# Patient Record
Sex: FEMALE | Race: BLACK OR AFRICAN AMERICAN | NOT HISPANIC OR LATINO
[De-identification: names, ages, dates, MRNs, and addresses within clinical notes are randomized per-mention and may not be internally consistent; named-entity substitution may affect disease eponyms.]

---

## 2023-05-22 ENCOUNTER — APPOINTMENT (OUTPATIENT)
Dept: CARDIOLOGY | Facility: CLINIC | Age: 43
End: 2023-05-22
Payer: MEDICAID

## 2023-05-22 ENCOUNTER — NON-APPOINTMENT (OUTPATIENT)
Age: 43
End: 2023-05-22

## 2023-05-22 VITALS
SYSTOLIC BLOOD PRESSURE: 149 MMHG | DIASTOLIC BLOOD PRESSURE: 102 MMHG | BODY MASS INDEX: 43.4 KG/M2 | OXYGEN SATURATION: 99 % | HEIGHT: 69 IN | HEART RATE: 83 BPM | WEIGHT: 293 LBS

## 2023-05-22 DIAGNOSIS — Z78.9 OTHER SPECIFIED HEALTH STATUS: ICD-10-CM

## 2023-05-22 DIAGNOSIS — Z83.3 FAMILY HISTORY OF DIABETES MELLITUS: ICD-10-CM

## 2023-05-22 DIAGNOSIS — D64.9 ANEMIA, UNSPECIFIED: ICD-10-CM

## 2023-05-22 DIAGNOSIS — E66.9 OBESITY, UNSPECIFIED: ICD-10-CM

## 2023-05-22 DIAGNOSIS — Z82.49 FAMILY HISTORY OF ISCHEMIC HEART DISEASE AND OTHER DISEASES OF THE CIRCULATORY SYSTEM: ICD-10-CM

## 2023-05-22 DIAGNOSIS — I51.7 CARDIOMEGALY: ICD-10-CM

## 2023-05-22 DIAGNOSIS — F43.10 POST-TRAUMATIC STRESS DISORDER, UNSPECIFIED: ICD-10-CM

## 2023-05-22 PROCEDURE — 99205 OFFICE O/P NEW HI 60 MIN: CPT | Mod: 25

## 2023-05-22 PROCEDURE — 93000 ELECTROCARDIOGRAM COMPLETE: CPT

## 2023-05-22 RX ORDER — CHOLECALCIFEROL (VITAMIN D3) 1250 MCG
1.25 MG CAPSULE ORAL
Refills: 0 | Status: ACTIVE | COMMUNITY

## 2023-05-22 RX ORDER — CHLORTHALIDONE 25 MG/1
25 TABLET ORAL DAILY
Qty: 90 | Refills: 3 | Status: ACTIVE | COMMUNITY
Start: 2023-05-22 | End: 1900-01-01

## 2023-05-22 RX ORDER — FOLIC ACID 1 MG/1
1 TABLET ORAL
Refills: 0 | Status: ACTIVE | COMMUNITY

## 2023-05-22 RX ORDER — VITAMIN B COMPLEX
TABLET ORAL
Refills: 0 | Status: ACTIVE | COMMUNITY

## 2023-05-22 RX ORDER — MULTIVIT-MIN/FOLIC/VIT K/LYCOP 400-300MCG
1000 TABLET ORAL
Refills: 0 | Status: ACTIVE | COMMUNITY

## 2023-05-22 NOTE — PHYSICAL EXAM
[Well Developed] : well developed [Well Nourished] : well nourished [No Acute Distress] : no acute distress [Normal Conjunctiva] : normal conjunctiva [No Carotid Bruit] : no carotid bruit [Normal Venous Pressure] : normal venous pressure [Normal S1, S2] : normal S1, S2 [No Murmur] : no murmur [No Rub] : no rub [No Gallop] : no gallop [Clear Lung Fields] : clear lung fields [Good Air Entry] : good air entry [No Respiratory Distress] : no respiratory distress  [Soft] : abdomen soft [Non Tender] : non-tender [Normal Bowel Sounds] : normal bowel sounds [No Masses/organomegaly] : no masses/organomegaly [Normal Gait] : normal gait [No Edema] : no edema [No Cyanosis] : no cyanosis [No Varicosities] : no varicosities [No Clubbing] : no clubbing [No Rash] : no rash [No Skin Lesions] : no skin lesions [Moves all extremities] : moves all extremities [No Focal Deficits] : no focal deficits [Normal Speech] : normal speech [Alert and Oriented] : alert and oriented [Normal memory] : normal memory

## 2023-06-12 PROBLEM — I51.7 CARDIOMEGALY: Status: ACTIVE | Noted: 2023-05-22

## 2023-06-12 NOTE — HISTORY OF PRESENT ILLNESS
[FreeTextEntry1] : Ms. Lion is a 43 year-old woman with headache, left shoulder pain going to the left leg - noticed symptoms more post covid vaccine.Noted chest pressure when she lays down but not when walking around.

## 2023-06-12 NOTE — DISCUSSION/SUMMARY
[FreeTextEntry1] : Plan:\par 1. c/w medrx\par 2. diet and lifesttyle mod [EKG obtained to assist in diagnosis and management of assessed problem(s)] : EKG obtained to assist in diagnosis and management of assessed problem(s)

## 2023-06-28 ENCOUNTER — APPOINTMENT (OUTPATIENT)
Dept: CARDIOLOGY | Facility: CLINIC | Age: 43
End: 2023-06-28
Payer: MEDICAID

## 2023-06-28 ENCOUNTER — NON-APPOINTMENT (OUTPATIENT)
Age: 43
End: 2023-06-28

## 2023-06-28 VITALS
DIASTOLIC BLOOD PRESSURE: 85 MMHG | HEIGHT: 69 IN | OXYGEN SATURATION: 98 % | BODY MASS INDEX: 43.4 KG/M2 | WEIGHT: 293 LBS | HEART RATE: 70 BPM | SYSTOLIC BLOOD PRESSURE: 127 MMHG

## 2023-06-28 DIAGNOSIS — R06.02 SHORTNESS OF BREATH: ICD-10-CM

## 2023-06-28 DIAGNOSIS — I10 ESSENTIAL (PRIMARY) HYPERTENSION: ICD-10-CM

## 2023-06-28 DIAGNOSIS — Z00.00 ENCOUNTER FOR GENERAL ADULT MEDICAL EXAMINATION W/OUT ABNORMAL FINDINGS: ICD-10-CM

## 2023-06-28 PROCEDURE — 93000 ELECTROCARDIOGRAM COMPLETE: CPT

## 2023-06-28 PROCEDURE — 99214 OFFICE O/P EST MOD 30 MIN: CPT | Mod: 25

## 2023-08-06 PROBLEM — I10 BENIGN ESSENTIAL HYPERTENSION: Status: ACTIVE | Noted: 2023-05-22

## 2023-08-06 PROBLEM — Z00.00 ENCOUNTER FOR PREVENTIVE HEALTH EXAMINATION: Status: ACTIVE | Noted: 2023-05-16

## 2023-08-06 PROBLEM — R06.02 SHORTNESS OF BREATH: Status: ACTIVE | Noted: 2023-05-22

## 2023-08-06 NOTE — HISTORY OF PRESENT ILLNESS
[FreeTextEntry1] : Ms. Lion is a 43 year-old woman with headache, left shoulder pain going to the left leg - noticed symptoms more post covid vaccine.Noted chest pressure when she lays down but not when walking around. \par \par Follow-up 6/28/23 - no new issues. BP better controlled.

## 2023-08-06 NOTE — DISCUSSION/SUMMARY
[FreeTextEntry1] : 1. C/w current management.\par 2. Diet and lifestyle modification.\par 3. Follow up in 3 months. [EKG obtained to assist in diagnosis and management of assessed problem(s)] : EKG obtained to assist in diagnosis and management of assessed problem(s)

## 2023-11-22 ENCOUNTER — APPOINTMENT (OUTPATIENT)
Dept: CARDIOLOGY | Facility: CLINIC | Age: 43
End: 2023-11-22

## 2024-01-24 ENCOUNTER — EMERGENCY (EMERGENCY)
Facility: HOSPITAL | Age: 44
LOS: 0 days | Discharge: HOME | End: 2024-01-24
Attending: STUDENT IN AN ORGANIZED HEALTH CARE EDUCATION/TRAINING PROGRAM
Payer: MEDICAID

## 2024-01-24 VITALS
HEIGHT: 69 IN | RESPIRATION RATE: 20 BRPM | HEART RATE: 84 BPM | WEIGHT: 287.04 LBS | TEMPERATURE: 98 F | OXYGEN SATURATION: 97 % | SYSTOLIC BLOOD PRESSURE: 146 MMHG | DIASTOLIC BLOOD PRESSURE: 97 MMHG

## 2024-01-24 VITALS
RESPIRATION RATE: 20 BRPM | DIASTOLIC BLOOD PRESSURE: 80 MMHG | OXYGEN SATURATION: 100 % | SYSTOLIC BLOOD PRESSURE: 135 MMHG | TEMPERATURE: 98 F | HEART RATE: 64 BPM

## 2024-01-24 DIAGNOSIS — R51.9 HEADACHE, UNSPECIFIED: ICD-10-CM

## 2024-01-24 LAB
ALBUMIN SERPL ELPH-MCNC: 3.4 G/DL — SIGNIFICANT CHANGE UP (ref 3.3–5)
ALP SERPL-CCNC: 81 U/L — SIGNIFICANT CHANGE UP (ref 40–120)
ALT FLD-CCNC: 22 U/L — SIGNIFICANT CHANGE UP (ref 12–78)
ANION GAP SERPL CALC-SCNC: 6 MMOL/L — SIGNIFICANT CHANGE UP (ref 5–17)
ANISOCYTOSIS BLD QL: SLIGHT — SIGNIFICANT CHANGE UP
AST SERPL-CCNC: 22 U/L — SIGNIFICANT CHANGE UP (ref 15–37)
BASOPHILS # BLD AUTO: 0.07 K/UL — SIGNIFICANT CHANGE UP (ref 0–0.2)
BASOPHILS NFR BLD AUTO: 1.3 % — SIGNIFICANT CHANGE UP (ref 0–2)
BILIRUB SERPL-MCNC: 0.3 MG/DL — SIGNIFICANT CHANGE UP (ref 0.2–1.2)
BUN SERPL-MCNC: 9 MG/DL — SIGNIFICANT CHANGE UP (ref 7–23)
CALCIUM SERPL-MCNC: 9 MG/DL — SIGNIFICANT CHANGE UP (ref 8.5–10.1)
CHLORIDE SERPL-SCNC: 108 MMOL/L — SIGNIFICANT CHANGE UP (ref 96–108)
CO2 SERPL-SCNC: 27 MMOL/L — SIGNIFICANT CHANGE UP (ref 22–31)
CREAT SERPL-MCNC: 0.82 MG/DL — SIGNIFICANT CHANGE UP (ref 0.5–1.3)
DACRYOCYTES BLD QL SMEAR: SLIGHT — SIGNIFICANT CHANGE UP
EGFR: 91 ML/MIN/1.73M2 — SIGNIFICANT CHANGE UP
ELLIPTOCYTES BLD QL SMEAR: SLIGHT — SIGNIFICANT CHANGE UP
EOSINOPHIL # BLD AUTO: 0.11 K/UL — SIGNIFICANT CHANGE UP (ref 0–0.5)
EOSINOPHIL NFR BLD AUTO: 2 % — SIGNIFICANT CHANGE UP (ref 0–6)
GLUCOSE SERPL-MCNC: 91 MG/DL — SIGNIFICANT CHANGE UP (ref 70–99)
HCG UR QL: NEGATIVE — SIGNIFICANT CHANGE UP
HCT VFR BLD CALC: 34.5 % — SIGNIFICANT CHANGE UP (ref 34.5–45)
HGB BLD-MCNC: 10.4 G/DL — LOW (ref 11.5–15.5)
IMM GRANULOCYTES NFR BLD AUTO: 0.2 % — SIGNIFICANT CHANGE UP (ref 0–0.9)
LYMPHOCYTES # BLD AUTO: 1.83 K/UL — SIGNIFICANT CHANGE UP (ref 1–3.3)
LYMPHOCYTES # BLD AUTO: 34.1 % — SIGNIFICANT CHANGE UP (ref 13–44)
MANUAL SMEAR VERIFICATION: SIGNIFICANT CHANGE UP
MCHC RBC-ENTMCNC: 23.3 PG — LOW (ref 27–34)
MCHC RBC-ENTMCNC: 30.1 G/DL — LOW (ref 32–36)
MCV RBC AUTO: 77.2 FL — LOW (ref 80–100)
MICROCYTES BLD QL: SLIGHT — SIGNIFICANT CHANGE UP
MONOCYTES # BLD AUTO: 0.37 K/UL — SIGNIFICANT CHANGE UP (ref 0–0.9)
MONOCYTES NFR BLD AUTO: 6.9 % — SIGNIFICANT CHANGE UP (ref 2–14)
NEUTROPHILS # BLD AUTO: 2.98 K/UL — SIGNIFICANT CHANGE UP (ref 1.8–7.4)
NEUTROPHILS NFR BLD AUTO: 55.5 % — SIGNIFICANT CHANGE UP (ref 43–77)
NRBC # BLD: 0 /100 WBCS — SIGNIFICANT CHANGE UP (ref 0–0)
PLAT MORPH BLD: NORMAL — SIGNIFICANT CHANGE UP
PLATELET # BLD AUTO: 313 K/UL — SIGNIFICANT CHANGE UP (ref 150–400)
POIKILOCYTOSIS BLD QL AUTO: SLIGHT — SIGNIFICANT CHANGE UP
POTASSIUM SERPL-MCNC: 4.5 MMOL/L — SIGNIFICANT CHANGE UP (ref 3.5–5.3)
POTASSIUM SERPL-SCNC: 4.5 MMOL/L — SIGNIFICANT CHANGE UP (ref 3.5–5.3)
PROT SERPL-MCNC: 8.2 GM/DL — SIGNIFICANT CHANGE UP (ref 6–8.3)
RBC # BLD: 4.47 M/UL — SIGNIFICANT CHANGE UP (ref 3.8–5.2)
RBC # FLD: 22 % — HIGH (ref 10.3–14.5)
RBC BLD AUTO: ABNORMAL
SODIUM SERPL-SCNC: 141 MMOL/L — SIGNIFICANT CHANGE UP (ref 135–145)
WBC # BLD: 5.37 K/UL — SIGNIFICANT CHANGE UP (ref 3.8–10.5)
WBC # FLD AUTO: 5.37 K/UL — SIGNIFICANT CHANGE UP (ref 3.8–10.5)

## 2024-01-24 PROCEDURE — 99284 EMERGENCY DEPT VISIT MOD MDM: CPT

## 2024-01-24 RX ORDER — ACETAMINOPHEN 500 MG
2 TABLET ORAL
Qty: 30 | Refills: 0
Start: 2024-01-24 | End: 2024-01-28

## 2024-01-24 RX ORDER — IBUPROFEN 200 MG
1 TABLET ORAL
Qty: 20 | Refills: 0
Start: 2024-01-24 | End: 2024-01-28

## 2024-01-24 RX ORDER — METOCLOPRAMIDE HCL 10 MG
10 TABLET ORAL ONCE
Refills: 0 | Status: COMPLETED | OUTPATIENT
Start: 2024-01-24 | End: 2024-01-24

## 2024-01-24 RX ORDER — ACETAMINOPHEN 500 MG
1000 TABLET ORAL ONCE
Refills: 0 | Status: COMPLETED | OUTPATIENT
Start: 2024-01-24 | End: 2024-01-24

## 2024-01-24 RX ORDER — SODIUM CHLORIDE 9 MG/ML
1000 INJECTION INTRAMUSCULAR; INTRAVENOUS; SUBCUTANEOUS ONCE
Refills: 0 | Status: COMPLETED | OUTPATIENT
Start: 2024-01-24 | End: 2024-01-24

## 2024-01-24 RX ADMIN — Medication 10 MILLIGRAM(S): at 14:41

## 2024-01-24 RX ADMIN — SODIUM CHLORIDE 1000 MILLILITER(S): 9 INJECTION INTRAMUSCULAR; INTRAVENOUS; SUBCUTANEOUS at 14:41

## 2024-01-24 RX ADMIN — Medication 400 MILLIGRAM(S): at 13:12

## 2024-01-24 NOTE — ED PROVIDER NOTE - PATIENT PORTAL LINK FT
You can access the FollowMyHealth Patient Portal offered by [facility] by registering at the following website: [email or web address]. By joining [email or web address]’s FollowMyHealth portal, you will also be able to view your health information using other applications (apps) compatible with our system.

## 2024-01-24 NOTE — ED PROVIDER NOTE - PHYSICAL EXAMINATION
General: Well appearing female in no acute distress  HEENT: Normocephalic, atraumatic. Moist mucous membranes. Oropharynx clear. No lymphadenopathy.  Eyes: No scleral icterus. EOMI. ETHAN.  Neck:. Soft and supple. Full ROM without pain. No midline tenderness  Cardiac: Regular rate and regular rhythm. No murmurs, rubs, gallops. Peripheral pulses 2+ and symmetric. No LE edema.  Resp: Lungs CTAB. Speaking in full sentences. No wheezes, rales or rhonchi.  Abd: Soft, non-tender, non-distended. No guarding or rebound. No scars, masses, or lesions.  Back: Spine midline and non-tender. No CVA tenderness.    Skin: No rashes, abrasions, or lacerations.  Neuro: AO x 3. Moves all extremities symmetrically. Motor strength and sensation grossly intact. ambulatory w/ steady gait

## 2024-01-24 NOTE — ED ADULT NURSE NOTE - ED STAT RN HANDOFF DETAILS
Report endorsed to oncoming RN Natalya  for my break coverage. Report given to covering RN and patient informed during rounding Safety checks compld this shift/Safety rounds completed hourly.  Fall +skin precs in place. Any issues endorsed to oncoming RN for follow up. RN Assumed patient's care for coverage.

## 2024-01-24 NOTE — ED PROVIDER NOTE - OBJECTIVE STATEMENT
43 F presenting to the ED for headache. Pt states that she gets headaches regularly but she does not take medication for the pain. Today the headache was similar to her previous headaches but increased in intensity. Pt denies nausea, vomiting, weakness, dizziness, back pain. headache had a gradual onset of her occiput and top of her head and continually worsened throughout the day

## 2024-01-24 NOTE — ED PROVIDER NOTE - CLINICAL SUMMARY MEDICAL DECISION MAKING FREE TEXT BOX
Authored by Resident/PA/NP female presenting to the ED for headache in similar constellation of prior headaches    low concern for ICH  no concern for migraine, concern for tension headache  labs  IV fluids, pain control  dispo home w/ pmd follow-up and return precautions

## 2024-01-24 NOTE — ED PROVIDER NOTE - NS ED ROS FT
General: Denies fever, chills  HEENT: Denies sensory changes, sore throat  Neck: Denies neck pain, neck stiffness  Resp: Denies coughing, SOB  Cardiovascular: Denies CP, palpitations, LE edema  GI: Denies nausea, vomiting, abdominal pain, diarrhea, constipation, blood in stool  : Denies dysuria, hematuria, frequency, incontinence  MSK: Denies back pain  Neuro: + headache  Skin: Denies rashes.

## 2024-01-31 NOTE — ED ADULT NURSE NOTE - CHPI ED NUR SYMPTOMS NEG
Patient pharmacy [facility] [place] closing and needs scripts sent to [facility] in [place].   no cough